# Patient Record
Sex: FEMALE | Race: WHITE | URBAN - METROPOLITAN AREA
[De-identification: names, ages, dates, MRNs, and addresses within clinical notes are randomized per-mention and may not be internally consistent; named-entity substitution may affect disease eponyms.]

---

## 2017-03-18 ENCOUNTER — APPOINTMENT (OUTPATIENT)
Dept: CT IMAGING | Facility: CLINIC | Age: 22
End: 2017-03-18
Attending: EMERGENCY MEDICINE
Payer: COMMERCIAL

## 2017-03-18 ENCOUNTER — HOSPITAL ENCOUNTER (EMERGENCY)
Facility: CLINIC | Age: 22
Discharge: HOME | End: 2017-03-18
Attending: EMERGENCY MEDICINE | Admitting: EMERGENCY MEDICINE
Payer: COMMERCIAL

## 2017-03-18 VITALS
DIASTOLIC BLOOD PRESSURE: 63 MMHG | HEIGHT: 61 IN | BODY MASS INDEX: 26.43 KG/M2 | SYSTOLIC BLOOD PRESSURE: 97 MMHG | WEIGHT: 140 LBS | RESPIRATION RATE: 16 BRPM | TEMPERATURE: 98.2 F | OXYGEN SATURATION: 99 %

## 2017-03-18 DIAGNOSIS — N20.1 CALCULUS OF URETER: ICD-10-CM

## 2017-03-18 LAB
ALBUMIN SERPL-MCNC: 4 G/DL (ref 3.4–5)
ALBUMIN UR-MCNC: NEGATIVE MG/DL
ALP SERPL-CCNC: 75 U/L (ref 40–150)
ALT SERPL W P-5'-P-CCNC: 15 U/L (ref 0–50)
ANION GAP SERPL CALCULATED.3IONS-SCNC: 11 MMOL/L (ref 3–14)
APPEARANCE UR: CLEAR
AST SERPL W P-5'-P-CCNC: 13 U/L (ref 0–45)
BASOPHILS # BLD AUTO: 0 10E9/L (ref 0–0.2)
BASOPHILS NFR BLD AUTO: 0.1 %
BILIRUB SERPL-MCNC: 0.3 MG/DL (ref 0.2–1.3)
BILIRUB UR QL STRIP: NEGATIVE
BUN SERPL-MCNC: 17 MG/DL (ref 7–30)
CALCIUM SERPL-MCNC: 9.3 MG/DL (ref 8.5–10.1)
CHLORIDE SERPL-SCNC: 106 MMOL/L (ref 94–109)
CO2 SERPL-SCNC: 24 MMOL/L (ref 20–32)
COLOR UR AUTO: YELLOW
CREAT SERPL-MCNC: 0.58 MG/DL (ref 0.52–1.04)
DIFFERENTIAL METHOD BLD: ABNORMAL
EOSINOPHIL # BLD AUTO: 0.1 10E9/L (ref 0–0.7)
EOSINOPHIL NFR BLD AUTO: 0.8 %
ERYTHROCYTE [DISTWIDTH] IN BLOOD BY AUTOMATED COUNT: 15.1 % (ref 10–15)
GFR SERPL CREATININE-BSD FRML MDRD: NORMAL ML/MIN/1.7M2
GLUCOSE SERPL-MCNC: 93 MG/DL (ref 70–99)
GLUCOSE UR STRIP-MCNC: NEGATIVE MG/DL
HCG UR QL: NEGATIVE
HCT VFR BLD AUTO: 37.5 % (ref 35–47)
HGB BLD-MCNC: 12.7 G/DL (ref 11.7–15.7)
HGB UR QL STRIP: ABNORMAL
IMM GRANULOCYTES # BLD: 0 10E9/L (ref 0–0.4)
IMM GRANULOCYTES NFR BLD: 0.1 %
KETONES UR STRIP-MCNC: NEGATIVE MG/DL
LEUKOCYTE ESTERASE UR QL STRIP: NEGATIVE
LIPASE SERPL-CCNC: 92 U/L (ref 73–393)
LYMPHOCYTES # BLD AUTO: 3.1 10E9/L (ref 0.8–5.3)
LYMPHOCYTES NFR BLD AUTO: 41 %
MCH RBC QN AUTO: 24.8 PG (ref 26.5–33)
MCHC RBC AUTO-ENTMCNC: 33.9 G/DL (ref 31.5–36.5)
MCV RBC AUTO: 73 FL (ref 78–100)
MONOCYTES # BLD AUTO: 0.7 10E9/L (ref 0–1.3)
MONOCYTES NFR BLD AUTO: 8.8 %
MUCOUS THREADS #/AREA URNS LPF: PRESENT /LPF
NEUTROPHILS # BLD AUTO: 3.7 10E9/L (ref 1.6–8.3)
NEUTROPHILS NFR BLD AUTO: 49.2 %
NITRATE UR QL: NEGATIVE
NRBC # BLD AUTO: 0 10*3/UL
NRBC BLD AUTO-RTO: 0 /100
PH UR STRIP: 6 PH (ref 5–7)
PLATELET # BLD AUTO: 316 10E9/L (ref 150–450)
POTASSIUM SERPL-SCNC: 3.5 MMOL/L (ref 3.4–5.3)
PROT SERPL-MCNC: 7.6 G/DL (ref 6.8–8.8)
RBC # BLD AUTO: 5.13 10E12/L (ref 3.8–5.2)
RBC #/AREA URNS AUTO: >182 /HPF (ref 0–2)
SODIUM SERPL-SCNC: 141 MMOL/L (ref 133–144)
SP GR UR STRIP: 1.02 (ref 1–1.03)
SQUAMOUS #/AREA URNS AUTO: 1 /HPF (ref 0–1)
URN SPEC COLLECT METH UR: ABNORMAL
UROBILINOGEN UR STRIP-MCNC: NORMAL MG/DL (ref 0–2)
WBC # BLD AUTO: 7.5 10E9/L (ref 4–11)
WBC #/AREA URNS AUTO: <1 /HPF (ref 0–2)

## 2017-03-18 PROCEDURE — 25000128 H RX IP 250 OP 636: Performed by: EMERGENCY MEDICINE

## 2017-03-18 PROCEDURE — 96375 TX/PRO/DX INJ NEW DRUG ADDON: CPT

## 2017-03-18 PROCEDURE — 85025 COMPLETE CBC W/AUTO DIFF WBC: CPT | Performed by: EMERGENCY MEDICINE

## 2017-03-18 PROCEDURE — 81025 URINE PREGNANCY TEST: CPT | Performed by: EMERGENCY MEDICINE

## 2017-03-18 PROCEDURE — 96361 HYDRATE IV INFUSION ADD-ON: CPT

## 2017-03-18 PROCEDURE — 74176 CT ABD & PELVIS W/O CONTRAST: CPT

## 2017-03-18 PROCEDURE — 81001 URINALYSIS AUTO W/SCOPE: CPT | Performed by: EMERGENCY MEDICINE

## 2017-03-18 PROCEDURE — 80053 COMPREHEN METABOLIC PANEL: CPT | Performed by: EMERGENCY MEDICINE

## 2017-03-18 PROCEDURE — 99285 EMERGENCY DEPT VISIT HI MDM: CPT | Mod: 25

## 2017-03-18 PROCEDURE — 96374 THER/PROPH/DIAG INJ IV PUSH: CPT

## 2017-03-18 PROCEDURE — 83690 ASSAY OF LIPASE: CPT | Performed by: EMERGENCY MEDICINE

## 2017-03-18 RX ORDER — ONDANSETRON 4 MG/1
4 TABLET, ORALLY DISINTEGRATING ORAL EVERY 8 HOURS PRN
Qty: 10 TABLET | Refills: 0 | Status: SHIPPED | OUTPATIENT
Start: 2017-03-18 | End: 2017-03-21

## 2017-03-18 RX ORDER — ONDANSETRON 2 MG/ML
4 INJECTION INTRAMUSCULAR; INTRAVENOUS EVERY 30 MIN PRN
Status: DISCONTINUED | OUTPATIENT
Start: 2017-03-18 | End: 2017-03-18 | Stop reason: HOSPADM

## 2017-03-18 RX ORDER — HYDROCODONE BITARTRATE AND ACETAMINOPHEN 5; 325 MG/1; MG/1
1-2 TABLET ORAL EVERY 4 HOURS PRN
Qty: 20 TABLET | Refills: 0 | Status: SHIPPED | OUTPATIENT
Start: 2017-03-18

## 2017-03-18 RX ORDER — MORPHINE SULFATE 4 MG/ML
4 INJECTION, SOLUTION INTRAMUSCULAR; INTRAVENOUS
Status: COMPLETED | OUTPATIENT
Start: 2017-03-18 | End: 2017-03-18

## 2017-03-18 RX ORDER — KETOROLAC TROMETHAMINE 30 MG/ML
30 INJECTION, SOLUTION INTRAMUSCULAR; INTRAVENOUS ONCE
Status: COMPLETED | OUTPATIENT
Start: 2017-03-18 | End: 2017-03-18

## 2017-03-18 RX ORDER — TAMSULOSIN HYDROCHLORIDE 0.4 MG/1
0.4 CAPSULE ORAL DAILY
Qty: 10 CAPSULE | Refills: 0 | Status: SHIPPED | OUTPATIENT
Start: 2017-03-18 | End: 2017-03-28

## 2017-03-18 RX ADMIN — SODIUM CHLORIDE 1000 ML: 9 INJECTION, SOLUTION INTRAVENOUS at 14:33

## 2017-03-18 RX ADMIN — MORPHINE SULFATE 4 MG: 4 INJECTION, SOLUTION INTRAMUSCULAR; INTRAVENOUS at 14:35

## 2017-03-18 RX ADMIN — ONDANSETRON 4 MG: 2 INJECTION INTRAMUSCULAR; INTRAVENOUS at 14:33

## 2017-03-18 RX ADMIN — KETOROLAC TROMETHAMINE 30 MG: 30 INJECTION, SOLUTION INTRAMUSCULAR at 16:53

## 2017-03-18 ASSESSMENT — ENCOUNTER SYMPTOMS
FLANK PAIN: 1
HEMATURIA: 0
NAUSEA: 0
DYSURIA: 0
CONSTIPATION: 0
ABDOMINAL PAIN: 1
DIARRHEA: 0
FREQUENCY: 0
VOMITING: 0
FEVER: 0
CHILLS: 0

## 2017-03-18 NOTE — ED PROVIDER NOTES
"  History     Chief Complaint:  Flank Pain      HPI   Hali Garcia is a 21 year old female who presents with flank pain.  The patient reports developing pain that starts in her right flank and radiates around to the right side of her abdomen about 45 minutes prior to presentation.  She has had no nausea or vomiting and had a normal appetite this morning.  She has been having normal bowel movements and denies any diarrhea and constipation.  She has had no blood in her urine, frequency, or pain with urination.  Her menstrual period ended 2 days ago and was normal in timing and character.  She denies any vaginal discharge, fevers, or chills.  She has no history of kidney stones.    Allergies:  No known drug allergies.     Medications:    Ferrous sulfate  Singulair  Naproxen  Ibuprofen     Past Medical History:    Seasonal allergies    Past Surgical History:    History reviewed. No pertinent past surgical history.     Family History:    History reviewed. No pertinent family history.     Social History:  Marital Status:   Presents to the ED with family members.  Tobacco Use: No previous or current tobacco use.   Alcohol Use: No alcohol use.   PCP: Mae Mendez MD      Review of Systems   Constitutional: Negative for chills and fever.   Gastrointestinal: Positive for abdominal pain. Negative for constipation, diarrhea, nausea and vomiting.   Genitourinary: Positive for flank pain. Negative for dysuria, frequency, hematuria, vaginal bleeding and vaginal discharge.   All other systems reviewed and are negative.    Physical Exam   First Vitals:  BP: 118/78  Heart Rate: 99  Temp: 98.2  F (36.8  C)  Height: 154.9 cm (5' 1\")  Weight: 63.5 kg (140 lb)  SpO2: 100 %      Physical Exam  General: Patient is alert and uncomfortable appearing.  HEENT: Head atraumatic    Eyes: pupils equal and reactive. Conjunctiva clear   Nares: patent   Oropharynx: no lesions, uvula midline, no palatal draping, normal voice, no " trismus  Neck: Supple without lymphadenopathy, no meningismus  Chest: Heart regular rate and rhythm.   Lungs: Equal clear to auscultation with no wheeze or rales  Abdomen: Soft, right mid abdominal tenderness to palpation, no rebound or guarding, nondistended, normal bowel sounds  Back: No costovertebral angle tenderness, no midline C, T or L spine tenderness  Neuro: Grossly nonfocal, normal speech, strength equal bilaterally, CN 2-12 intact  Extremities: No deformities, equal radial and DP pulses. No clubbing, cyanosis.  No edema  Skin: Warm and dry with no rash.       Emergency Department Course     Imaging:  Abdomen/Pelvis CT without contrast, stone protocol:  1. 3 mm obstructing calculus distal right ureter near the ureterovesical orifice.  2. No other significant abnormalities.   Report per radiology.      Radiographic findings were communicated with the patient who voiced understanding of the findings.    Laboratory:  CBC: MCV 73 (L), MCH 24.8 (L), RDW 15.1 (H), otherwise WNL (WBC 7.5, HGB 12.7, )   CMP: WNL (Creatinine 0.58)   Lipase: 92    UA: Clear yellow urine, Blood large, RBC/HPF >182 (H), Mucous present, otherwise WNL   HCG Qualitative, urine: negative     Interventions:  (1433) Normal Saline, 1 liter, IV bolus   (1433) Zofran, 4 mg, IV injection   (1435) Morphine, 4 mg, IV injection   (1653) Toradol, 30 mg, IV injection     The patient's symptoms were improved with parenteral narcotics.    Emergency Department Course:  Nursing notes and vitals reviewed.  I performed an exam of the patient as documented above.     A peripheral IV was established. Blood was drawn from the patient. This was sent for laboratory testing, findings above.       Urine sample was obtained and sent for laboratory analysis, findings above.    The patient was sent for a CT scan of the abdomen and pelvis while in the emergency department, findings above.      Findings and plan explained to the patient. Patient discharged  home with instructions regarding supportive care, medications, and reasons to return. The importance of close follow-up was reviewed. The patient was prescribed Norco, Zofran, and Flomax.     Impression & Plan      Medical Decision Making:  Hali Garcia is a 21 year old female who presented with unilateral flank & abdominal pain consistent with renal colic. CT confirms a 3 mm ureteral stone near the ureterovesical orifice.  Renal function is normal/baseline.  CT and lab workup show no other alternative etiology that could be causing her symptoms (e.g., AAA, appendicitis, pyelonephritis).  There is no fever or convincing evidence of a urinary tract infection.  On recheck, her pain is controlled with interventions in the ED and she is tolerating POs.  I will prescribe supportive medications and Flomax to facilitate stone passage.  I have advised her to return for uncontrolled pain, vomiting, fever, or any other concerning symptoms.  I also advised to strain her urine to look for a stone and submit it to her primary doctor for lab analysis.   Finally, I have advised follow up with urology within 3-5 days.     Diagnosis:    ICD-10-CM    1. Calculus of ureter N20.1      Disposition:  Discharged to home.     Discharge Medications:  New Prescriptions    HYDROCODONE-ACETAMINOPHEN (NORCO) 5-325 MG PER TABLET    Take 1-2 tablets by mouth every 4 hours as needed for moderate to severe pain maximum 6 tablet(s) per day    ONDANSETRON (ZOFRAN ODT) 4 MG ODT TAB    Take 1 tablet (4 mg) by mouth every 8 hours as needed for nausea    TAMSULOSIN (FLOMAX) 0.4 MG CAPSULE    Take 1 capsule (0.4 mg) by mouth daily for 10 doses       IRadha, am serving as a scribe on 3/18/2017 at 2:18 PM to personally document services performed by Dr. Pollock based on my observations and the provider's statements to me.    Radha Zaman  3/18/2017    EMERGENCY DEPARTMENT       Odette Pollock MD  03/18/17 [id number]

## 2017-03-18 NOTE — DISCHARGE INSTRUCTIONS
Treating Kidney Stones: Expectant Therapy  Most kidney stones are about the size of a grape seed. Stones of this size are small enough to pass naturally. Once it is passed, a stone can be analyzed. This  wait and see  approach is called expectant therapy. Small stones can often be passed with expectant therapy. If pain is a problem, ask your doctor about pain medications. Then follow his or her directions on how much water to drink. Drinking more water creates more urine to  flush out  your stone. Also be sure to strain your urine. Take any stones you pass to your doctor for analysis.    Drink lots of water  Drinking lots of water may help your stone pass. Water also dilutes the chemicals in your urine. This reduces your risk of forming new stones. You may be told to drink 8, 12-ounce glasses of water a day. Avoid liquids that dehydrate you, such as those containing caffeine or alcohol.  Strain your urine  Straining your urine lets you collect your stone for analysis. Use the strainer each time you urinate. Strain your urine for as long as your doctor suggests. Watch for brown, tan, gold, or black specks or tiny rufino. These may be kidney stones.  Take your medicine  Your doctor may give you medicine that makes it more likely for you to pass the kidney stone.   Follow up with your doctor  Follow up by taking any stones you find to your doctor for analysis. The type of stone you have determines your diet and prevention program. You may need more tests in the future. These tests will ensure that new stones are not forming.    7318-1104 [employer]. [address] [place] [address], [place] [address]. All rights reserved. This information is not intended as a substitute for professional medical care. Always follow your healthcare professional's instructions.

## 2017-03-18 NOTE — ED AVS SNAPSHOT
Emergency Department    [address] [place] [address]    Phone:  [phone]    Fax:  [phone]                                       Hali Garcia   MRN: [id number]    Department:   Emergency Department   Date of Visit:  3/18/2017           After Visit Summary Signature Page     I have received my discharge instructions, and my questions have been answered. I have discussed any challenges I see with this plan with the nurse or doctor.    ..........................................................................................................................................  Patient/Patient Representative Signature      ..........................................................................................................................................  Patient Representative Print Name and Relationship to Patient    ..................................................               ................................................  Date                                            Time    ..........................................................................................................................................  Reviewed by Signature/Title    ...................................................              ..............................................  Date                                                            Time

## 2017-03-18 NOTE — ED AVS SNAPSHOT
Emergency Department    [address] [facility] [address]    Phone:  [phone]    Fax:  [phone]                                       Hali Garcia   MRN: [id number]    Department:   Emergency Department   Date of Visit:  3/18/2017           Patient Information     Date Of Birth          1995        Your diagnoses for this visit were:     Calculus of ureter        You were seen by Odette Pollock MD.      Follow-up Information     Schedule an appointment as soon as possible for a visit with Marquise Weber MD.    Specialty:  Urology    Contact information:    [facility]Y [facility]  [id number]  [facility] [id number] [phone]          Follow up with  Emergency Department.    Specialty:  EMERGENCY MEDICINE    Why:  If symptoms worsen    Contact information:    [address] [facility] [address] [phone]        Follow up with Mae Mendez MD.    Specialty:  Family Practice    Contact information:    [facility]  [address] [facility] [id number] [phone]          Discharge Instructions         Treating Kidney Stones: Expectant Therapy  Most kidney stones are about the size of a grape seed. Stones of this size are small enough to pass naturally. Once it is passed, a stone can be analyzed. This  wait and see  approach is called expectant therapy. Small stones can often be passed with expectant therapy. If pain is a problem, ask your doctor about pain medications. Then follow his or her directions on how much water to drink. Drinking more water creates more urine to  flush out  your stone. Also be sure to strain your urine. Take any stones you pass to your doctor for analysis.    Drink lots of water  Drinking lots of water may help your stone pass. Water also dilutes the chemicals in your urine. This reduces your risk of forming new stones. You may be told to drink 8, 12-ounce glasses of water a day. Avoid liquids that  dehydrate you, such as those containing caffeine or alcohol.  Strain your urine  Straining your urine lets you collect your stone for analysis. Use the strainer each time you urinate. Strain your urine for as long as your doctor suggests. Watch for brown, tan, gold, or black specks or tiny rufino. These may be kidney stones.  Take your medicine  Your doctor may give you medicine that makes it more likely for you to pass the kidney stone.   Follow up with your doctor  Follow up by taking any stones you find to your doctor for analysis. The type of stone you have determines your diet and prevention program. You may need more tests in the future. These tests will ensure that new stones are not forming.    3650-2422 [employer]. [address] [place] [address], [place] [id number]. All rights reserved. This information is not intended as a substitute for professional medical care. Always follow your healthcare professional's instructions.          24 Hour Appointment Hotline       To make an appointment at any [facility], call [phone] ([phone]). If you don't have a family doctor or clinic, we will help you find one. [place] clinics are conveniently located to serve the needs of you and your family.             Review of your medicines      START taking        Dose / Directions Last dose taken    HYDROcodone-acetaminophen 5-325 MG per tablet   Commonly known as:  NORCO   Dose:  1-2 tablet   Quantity:  20 tablet        Take 1-2 tablets by mouth every 4 hours as needed for moderate to severe pain maximum 6 tablet(s) per day   Refills:  0        ondansetron 4 MG ODT tab   Commonly known as:  ZOFRAN ODT   Dose:  4 mg   Quantity:  10 tablet        Take 1 tablet (4 mg) by mouth every 8 hours as needed for nausea   Refills:  0        tamsulosin 0.4 MG capsule   Commonly known as:  FLOMAX   Dose:  0.4 mg   Quantity:  10 capsule        Take 1 capsule (0.4 mg) by mouth daily for 10 doses   Refills:  0           Our records show that you are taking the medicines listed below. If these are incorrect, please call your family doctor or clinic.        Dose / Directions Last dose taken    azithromycin 250 MG tablet   Commonly known as:  ZITHROMAX   Quantity:  6 tablet        2 tabs po qd day 1, then 1 tab po qd days 2-5   Refills:  0        ferrous sulfate 325 (65 FE) MG Tbec EC tablet   Dose:  325 mg        Take 325 mg by mouth   Refills:  0        guaiFENesin-codeine 100-10 MG/5ML Soln solution   Commonly known as:  ROBITUSSIN AC   Dose:  5-10 mL   Quantity:  180 mL        Take 5-10 mLs by mouth every 6 hours as needed for cough   Refills:  0        ibuprofen 600 MG tablet   Commonly known as:  ADVIL/MOTRIN   Dose:  600 mg   Quantity:  30 tablet        Take 1 tablet (600 mg) by mouth every 6 hours as needed for moderate pain   Refills:  1        naproxen 500 MG tablet   Commonly known as:  NAPROSYN   Dose:  500 mg        Take 500 mg by mouth   Refills:  0                Prescriptions were sent or printed at these locations (3 Prescriptions)                   Other Prescriptions                Printed at Department/Unit printer (3 of 3)         HYDROcodone-acetaminophen (NORCO) 5-325 MG per tablet               ondansetron (ZOFRAN ODT) 4 MG ODT tab               tamsulosin (FLOMAX) 0.4 MG capsule                Procedures and tests performed during your visit     CBC with platelets differential    CT Abdomen Pelvis w/o Contrast (stone protocol)    Comprehensive metabolic panel    HCG qualitative urine    Lipase    UA reflex to Microscopic and Culture      Orders Needing Specimen Collection     None      Pending Results     Date and Time Order Name Status Description    3/18/2017 1421 CT Abdomen Pelvis w/o Contrast (stone protocol) Preliminary             Pending Culture Results     No orders found from 3/16/2017 to 3/19/2017.             Test Results from your hospital stay     3/18/2017  3:02 PM - Interface, [employer]  Results      Component Results     Component Value Ref Range & Units Status    WBC 7.5 4.0 - 11.0 10e9/L Final    RBC Count 5.13 3.8 - 5.2 10e12/L Final    Hemoglobin 12.7 11.7 - 15.7 g/dL Final    Hematocrit 37.5 35.0 - 47.0 % Final    MCV 73 (L) 78 - 100 fl Final    MCH 24.8 (L) 26.5 - 33.0 pg Final    MCHC 33.9 31.5 - 36.5 g/dL Final    RDW 15.1 (H) 10.0 - 15.0 % Final    Platelet Count 316 150 - 450 10e9/L Final    Diff Method Automated Method  Final    % Neutrophils 49.2 % Final    % Lymphocytes 41.0 % Final    % Monocytes 8.8 % Final    % Eosinophils 0.8 % Final    % Basophils 0.1 % Final    % Immature Granulocytes 0.1 % Final    Nucleated RBCs 0 0 /100 Final    Absolute Neutrophil 3.7 1.6 - 8.3 10e9/L Final    Absolute Lymphocytes 3.1 0.8 - 5.3 10e9/L Final    Absolute Monocytes 0.7 0.0 - 1.3 10e9/L Final    Absolute Eosinophils 0.1 0.0 - 0.7 10e9/L Final    Absolute Basophils 0.0 0.0 - 0.2 10e9/L Final    Abs Immature Granulocytes 0.0 0 - 0.4 10e9/L Final    Absolute Nucleated RBC 0.0  Final         3/18/2017  3:06 PM - Interface, [employer] Results      Component Results     Component Value Ref Range & Units Status    HCG Qual Urine Negative NEG Final         3/18/2017  3:07 PM - Interface, [employer] Results      Component Results     Component Value Ref Range & Units Status    Color Urine Yellow  Final    Appearance Urine Clear  Final    Glucose Urine Negative NEG mg/dL Final    Bilirubin Urine Negative NEG Final    Ketones Urine Negative NEG mg/dL Final    Specific Gravity Urine 1.016 1.003 - 1.035 Final    Blood Urine Large (A) NEG Final    pH Urine 6.0 5.0 - 7.0 pH Final    Protein Albumin Urine Negative NEG mg/dL Final    Urobilinogen mg/dL Normal 0.0 - 2.0 mg/dL Final    Nitrite Urine Negative NEG Final    Leukocyte Esterase Urine Negative NEG Final    Source Midstream Urine  Final    RBC Urine >182 (H) 0 - 2 /HPF Final    WBC Urine <1 0 - 2 /HPF Final    Squamous Epithelial /HPF Urine 1 0 - 1 /HPF Final     Mucous Urine Present (A) NEG /LPF Final         3/18/2017  3:43 PM - Interface, [employer]      Narrative     CT ABDOMEN PELVIS WITHOUT CONTRAST 3/18/2017 3:16 PM    HISTORY: Right flank pain. Evaluate for kidney stones.    TECHNIQUE: Helical axial scans from the dome of liver through the  pubic symphysis without contrast. Radiation dose for this scan was  reduced using automated exposure control, adjustment of the mA and/or  kV according to patient size, or iterative reconstruction technique.    COMPARISON: None.    FINDINGS: There is minimal right hydronephrosis and hydroureter  related to a 3 mm obstructing calculus at or just proximal to the  right ureterovesical orifice (image 68 of series 2 and image 24 of  series 3). No evidence for left-sided urinary tract obstruction. No  other urinary tract calculi bilaterally.    The liver, spleen, pancreas, bilateral adrenal glands and remainder of  the kidneys bilaterally without contrast are unremarkable. The bowel  and mesentery in the upper abdomen show no abnormality.    Scans through the pelvis show no additional abnormalities. No free  fluid. The appendix is probably identified (image 59 of series 2) and  there is no evidence for appendicitis. There are a few slightly  prominent lymph nodes in the right lower quadrant mesentery in the  upper pelvis (image 18 of series 3) of doubtful significance.        Impression     IMPRESSION:  1. 3 mm obstructing calculus distal right ureter near the  ureterovesical orifice.  2. No other significant abnormalities.          3/18/2017  3:20 PM - Interface, [employer] Results      Component Results     Component Value Ref Range & Units Status    Lipase 92 73 - 393 U/L Final         3/18/2017  3:22 PM - Interface, [employer] Results      Component Results     Component Value Ref Range & Units Status    Sodium 141 133 - 144 mmol/L Final    Potassium 3.5 3.4 - 5.3 mmol/L Final    Chloride 106 94 - 109 mmol/L Final    Carbon Dioxide  24 20 - 32 mmol/L Final    Anion Gap 11 3 - 14 mmol/L Final    Glucose 93 70 - 99 mg/dL Final    Urea Nitrogen 17 7 - 30 mg/dL Final    Creatinine 0.58 0.52 - 1.04 mg/dL Final    GFR Estimate >90  Non  GFR Calc   >60 mL/min/1.7m2 Final    GFR Estimate If Black >90   GFR Calc   >60 mL/min/1.7m2 Final    Calcium 9.3 8.5 - 10.1 mg/dL Final    Bilirubin Total 0.3 0.2 - 1.3 mg/dL Final    Albumin 4.0 3.4 - 5.0 g/dL Final    Protein Total 7.6 6.8 - 8.8 g/dL Final    Alkaline Phosphatase 75 40 - 150 U/L Final    ALT 15 0 - 50 U/L Final    AST 13 0 - 45 U/L Final                Clinical Quality Measure: Blood Pressure Screening     Your blood pressure was checked while you were in the emergency department today. The last reading we obtained was  BP: 97/63 . Please read the guidelines below about what these numbers mean and what you should do about them.  If your systolic blood pressure (the top number) is less than 120 and your diastolic blood pressure (the bottom number) is less than 80, then your blood pressure is normal. There is nothing more that you need to do about it.  If your systolic blood pressure (the top number) is 120-139 or your diastolic blood pressure (the bottom number) is 80-89, your blood pressure may be higher than it should be. You should have your blood pressure rechecked within a year by a primary care provider.  If your systolic blood pressure (the top number) is 140 or greater or your diastolic blood pressure (the bottom number) is 90 or greater, you may have high blood pressure. High blood pressure is treatable, but if left untreated over time it can put you at risk for heart attack, stroke, or kidney failure. You should have your blood pressure rechecked by a primary care provider within the next 4 weeks.  If your provider in the emergency department today gave you specific instructions to follow-up with your doctor or provider even sooner than that, you should  "follow that instruction and not wait for up to 4 weeks for your follow-up visit.        Thank you for choosing [facility]       Thank you for choosing [facility] for your care. Our goal is always to provide you with excellent care. Hearing back from our patients is one way we can continue to improve our services. Please take a few minutes to complete the written survey that you may receive in the mail after you visit with us. Thank you!        [facility] Information     [employer] lets you send messages to your doctor, view your test results, renew your prescriptions, schedule appointments and more. To sign up, go to [email or web address] . Click on \"Log in\" on the left side of the screen, which will take you to the Welcome page. Then click on \"Sign up Now\" on the right side of the page.     You will be asked to enter the access code listed below, as well as some personal information. Please follow the directions to create your username and password.     Your access code is: [email or web address]  Expires: 2017  4:48 PM     Your access code will  in 90 days. If you need help or a new code, please call your [facility] clinic or [phone].        Care EveryWhere ID     This is your Care EveryWhere ID. This could be used by other organizations to access your [facility] medical records  [id number]        After Visit Summary       This is your record. Keep this with you and show to your community pharmacist(s) and doctor(s) at your next visit.                  "

## 2018-01-07 ENCOUNTER — HEALTH MAINTENANCE LETTER (OUTPATIENT)
Age: 23
End: 2018-01-07